# Patient Record
Sex: FEMALE | Race: WHITE | NOT HISPANIC OR LATINO | URBAN - METROPOLITAN AREA
[De-identification: names, ages, dates, MRNs, and addresses within clinical notes are randomized per-mention and may not be internally consistent; named-entity substitution may affect disease eponyms.]

---

## 2021-08-27 ENCOUNTER — TELEPHONE (OUTPATIENT)
Dept: CARDIOLOGY | Facility: MEDICAL CENTER | Age: 56
End: 2021-08-27

## 2021-08-27 NOTE — TELEPHONE ENCOUNTER
Chart Prep    Called patient in regards to records for NP appointment w/ on 9/16/21 to review most recent lab results, ekg, any cardiac testing or ,if she has been treated by a cardiologist . No answer, left voicemail to call back.

## 2021-11-04 ENCOUNTER — TELEPHONE (OUTPATIENT)
Dept: HEMATOLOGY ONCOLOGY | Facility: MEDICAL CENTER | Age: 56
End: 2021-11-04

## 2021-11-04 NOTE — TELEPHONE ENCOUNTER
Left a message, asking the patient to call back and let us know where she had her most recent labs done. She is scheduled to see Dr Wade 11/10, but we do not have a copy of her labs. The phone number for the urgent care she was referred from, is no longer in service.

## 2021-11-10 ENCOUNTER — TELEPHONE (OUTPATIENT)
Dept: HEMATOLOGY ONCOLOGY | Facility: MEDICAL CENTER | Age: 56
End: 2021-11-10

## 2021-11-10 NOTE — LETTER
November 23, 2021        Mariluz Perdomo  [address] [place] [address]        Dear Mariluz:    After several attempts we have been unable to contact you regarding your missed appointment in our office.     Please call our office at [phone] and ask for Medical Oncology at your earliest convenience.     Thank you for your prompt attention to this matter.           [facility]       If you have any questions or concerns, please don't hesitate to call.        Sincerely,        Booker Wade M.D.    Electronically Signed

## 2022-04-22 ENCOUNTER — APPOINTMENT (OUTPATIENT)
Dept: CARDIOLOGY | Facility: MEDICAL CENTER | Age: 57
End: 2022-04-22
Payer: MEDICAID

## 2022-12-30 ENCOUNTER — OFFICE VISIT (OUTPATIENT)
Dept: URGENT CARE | Facility: PHYSICIAN GROUP | Age: 57
End: 2022-12-30
Payer: MEDICAID

## 2022-12-30 VITALS
BODY MASS INDEX: 27.09 KG/M2 | RESPIRATION RATE: 14 BRPM | TEMPERATURE: 98.6 F | HEIGHT: 72 IN | HEART RATE: 82 BPM | DIASTOLIC BLOOD PRESSURE: 78 MMHG | OXYGEN SATURATION: 99 % | SYSTOLIC BLOOD PRESSURE: 120 MMHG | WEIGHT: 200 LBS

## 2022-12-30 DIAGNOSIS — L01.00 IMPETIGO: ICD-10-CM

## 2022-12-30 DIAGNOSIS — K13.0 CHEILITIS: ICD-10-CM

## 2022-12-30 PROCEDURE — 99203 OFFICE O/P NEW LOW 30 MIN: CPT | Performed by: NURSE PRACTITIONER

## 2022-12-30 RX ORDER — METHIMAZOLE 5 MG/1
5 TABLET ORAL 2 TIMES DAILY
COMMUNITY
Start: 2022-12-08

## 2022-12-30 RX ORDER — BUSPIRONE HYDROCHLORIDE 5 MG/1
5 TABLET ORAL 3 TIMES DAILY
COMMUNITY
Start: 2022-12-07

## 2022-12-30 RX ORDER — ATORVASTATIN CALCIUM 10 MG/1
10 TABLET, FILM COATED ORAL DAILY
COMMUNITY
Start: 2022-11-27

## 2022-12-30 RX ORDER — METHIMAZOLE 5 MG/1
TABLET ORAL
COMMUNITY

## 2022-12-30 RX ORDER — IPRATROPIUM BROMIDE 17 UG/1
AEROSOL, METERED RESPIRATORY (INHALATION)
COMMUNITY
Start: 2022-12-26

## 2022-12-30 RX ORDER — ROPINIROLE 0.25 MG/1
0.25 TABLET, FILM COATED ORAL
COMMUNITY
Start: 2022-11-23

## 2022-12-30 RX ORDER — CEPHALEXIN 500 MG/1
500 CAPSULE ORAL 4 TIMES DAILY
Qty: 28 CAPSULE | Refills: 0 | Status: SHIPPED | OUTPATIENT
Start: 2022-12-30 | End: 2023-01-06

## 2022-12-30 ASSESSMENT — ENCOUNTER SYMPTOMS: FEVER: 0

## 2022-12-31 NOTE — PROGRESS NOTES
"  Subjective:     Mariluz Perdomo is a 57 y.o. female who presents for Rash (On face it comes and goes x 1 month)      Itchy rash around mouth. States it starts with bumps, and is dried up now. Has returned 3 times. Crusting. Clear fluid.           Rash  This is a new problem. The problem has been waxing and waning since onset. The affected locations include the face. The rash is characterized by itchiness and dryness. Pertinent negatives include no facial edema, fever, shortness of breath or sore throat.     History reviewed. No pertinent past medical history.    History reviewed. No pertinent surgical history.    Social History     Socioeconomic History    Marital status: Unknown     Spouse name: Not on file    Number of children: Not on file    Years of education: Not on file    Highest education level: Not on file   Occupational History    Not on file   Tobacco Use    Smoking status: Not on file    Smokeless tobacco: Not on file   Substance and Sexual Activity    Alcohol use: Not on file    Drug use: Not on file    Sexual activity: Not on file   Other Topics Concern    Not on file   Social History Narrative    Not on file     Social Determinants of Health     Financial Resource Strain: Not on file   Food Insecurity: Not on file   Transportation Needs: Not on file   Physical Activity: Not on file   Stress: Not on file   Social Connections: Not on file   Intimate Partner Violence: Not on file   Housing Stability: Not on file        History reviewed. No pertinent family history.     Not on File    Review of Systems   Constitutional:  Negative for fever.   HENT:  Negative for sore throat.    Respiratory:  Negative for shortness of breath.    Skin:  Positive for itching and rash.   All other systems reviewed and are negative.     Objective:   /78   Pulse 82   Temp 37 °C (98.6 °F) (Temporal)   Resp 14   Ht 1.88 m (6' 2\")   Wt 90.7 kg (200 lb)   SpO2 99% Comment: on 2 liters  BMI 25.68 kg/m²     Physical " Exam  Vitals reviewed.   Constitutional:       General: She is not in acute distress.     Appearance: She is well-developed.   HENT:      Head: Normocephalic and atraumatic.        Comments: Papular dry lesions, yellow crusting to left side.   Eyes:      Conjunctiva/sclera: Conjunctivae normal.   Cardiovascular:      Rate and Rhythm: Normal rate.   Pulmonary:      Effort: Pulmonary effort is normal. No respiratory distress.   Skin:     General: Skin is warm and dry.      Findings: Rash present.   Neurological:      Mental Status: She is alert and oriented to person, place, and time.      GCS: GCS eye subscore is 4. GCS verbal subscore is 5. GCS motor subscore is 6.   Psychiatric:         Speech: Speech normal.         Behavior: Behavior normal.         Thought Content: Thought content normal.         Judgment: Judgment normal.     Assessment/Plan:   1. Impetigo  - cephALEXin (KEFLEX) 500 MG Cap; Take 1 Capsule by mouth 4 times a day for 7 days.  Dispense: 28 Capsule; Refill: 0  - mupirocin (BACTROBAN) 2 % Ointment; Apply 1 Application topically 2 times a day for 5 days.  Dispense: 22 g; Refill: 0    2. Cheilitis  -Clean with soap and water  -Avoid scratching to cause any skin breakdown.    Follow up with PCP. Follow up for worsening symptoms, signs of infection, fever, pain, or any other concerns.     -Discussed treating for bacterial infection, with signs of impetigo. Honey crusting to lesions. Discussed differential to include ceilitis with area of symptoms, differential to include fungal or dermatitis correlation. Advised f/u for persistent symptoms. Trial ointment first. Contingent oral antibiotic.    Differential diagnosis, natural history, supportive care, and indications for immediate follow-up discussed.

## 2023-01-02 RX ORDER — VARENICLINE TARTRATE 1 MG/1
TABLET, FILM COATED ORAL
COMMUNITY
Start: 2022-11-22

## 2023-01-02 RX ORDER — VENLAFAXINE HYDROCHLORIDE 75 MG/1
75 CAPSULE, EXTENDED RELEASE ORAL DAILY
COMMUNITY
Start: 2022-10-31

## 2023-01-02 RX ORDER — ESCITALOPRAM OXALATE 20 MG/1
20 TABLET ORAL DAILY
COMMUNITY
Start: 2022-12-07

## 2023-01-02 ASSESSMENT — ENCOUNTER SYMPTOMS
SORE THROAT: 0
SHORTNESS OF BREATH: 0

## 2023-01-02 NOTE — PATIENT INSTRUCTIONS
-Clean with soap and water  -Avoid scratching to cause any skin breakdown.    Follow up with PCP. Follow up for worsening symptoms, signs of infection, fever, pain, or any other concerns.

## 2023-07-19 ENCOUNTER — HOSPITAL ENCOUNTER (OUTPATIENT)
Dept: RADIOLOGY | Facility: MEDICAL CENTER | Age: 58
End: 2023-07-19
Attending: PHYSICIAN ASSISTANT
Payer: MEDICAID

## 2023-07-19 DIAGNOSIS — S39.92XA INJURY OF COCCYX, INITIAL ENCOUNTER: ICD-10-CM

## 2023-07-19 DIAGNOSIS — S99.911A INJURY OF RIGHT ANKLE, INITIAL ENCOUNTER: ICD-10-CM

## 2023-07-19 DIAGNOSIS — M54.6 ACUTE THORACIC BACK PAIN, UNSPECIFIED BACK PAIN LATERALITY: ICD-10-CM

## 2023-07-19 PROCEDURE — 73610 X-RAY EXAM OF ANKLE: CPT | Mod: RT

## 2023-07-19 PROCEDURE — 72070 X-RAY EXAM THORAC SPINE 2VWS: CPT

## 2023-07-19 PROCEDURE — 72220 X-RAY EXAM SACRUM TAILBONE: CPT
